# Patient Record
Sex: MALE | Race: BLACK OR AFRICAN AMERICAN
[De-identification: names, ages, dates, MRNs, and addresses within clinical notes are randomized per-mention and may not be internally consistent; named-entity substitution may affect disease eponyms.]

---

## 2018-01-01 ENCOUNTER — HOSPITAL ENCOUNTER (EMERGENCY)
Dept: HOSPITAL 80 - FED | Age: 53
Discharge: HOME | End: 2018-01-01
Payer: MEDICAID

## 2018-01-01 VITALS
DIASTOLIC BLOOD PRESSURE: 81 MMHG | RESPIRATION RATE: 18 BRPM | OXYGEN SATURATION: 97 % | HEART RATE: 68 BPM | SYSTOLIC BLOOD PRESSURE: 159 MMHG

## 2018-01-01 VITALS — TEMPERATURE: 98.1 F

## 2018-01-01 DIAGNOSIS — I10: ICD-10-CM

## 2018-01-01 DIAGNOSIS — M79.674: ICD-10-CM

## 2018-01-01 DIAGNOSIS — G89.29: ICD-10-CM

## 2018-01-01 DIAGNOSIS — M79.675: Primary | ICD-10-CM
